# Patient Record
Sex: FEMALE
[De-identification: names, ages, dates, MRNs, and addresses within clinical notes are randomized per-mention and may not be internally consistent; named-entity substitution may affect disease eponyms.]

---

## 2022-11-01 PROBLEM — Z00.00 ENCOUNTER FOR PREVENTIVE HEALTH EXAMINATION: Status: ACTIVE | Noted: 2022-11-01

## 2022-12-02 ENCOUNTER — APPOINTMENT (OUTPATIENT)
Dept: GASTROENTEROLOGY | Facility: CLINIC | Age: 52
End: 2022-12-02

## 2022-12-02 DIAGNOSIS — Z12.11 ENCOUNTER FOR SCREENING FOR MALIGNANT NEOPLASM OF COLON: ICD-10-CM

## 2022-12-02 PROCEDURE — 99212 OFFICE O/P EST SF 10 MIN: CPT | Mod: 95

## 2022-12-02 RX ORDER — POLYETHYLENE GLYCOL 3350 AND ELECTROLYTES WITH LEMON FLAVOR 236; 22.74; 6.74; 5.86; 2.97 G/4L; G/4L; G/4L; G/4L; G/4L
236 POWDER, FOR SOLUTION ORAL
Qty: 1 | Refills: 0 | Status: ACTIVE | COMMUNITY
Start: 2022-12-02 | End: 1900-01-01

## 2022-12-02 NOTE — HISTORY OF PRESENT ILLNESS
[Home] : at home, [unfilled] , at the time of the visit. [Medical Office: ([facility])___] : at the medical office located in  [Verbal consent obtained from patient] : the patient, [unfilled] [FreeTextEntry4] : [place] [FreeTextEntry1] : 53yo female presents for evaluation for colonoscopy\par \par Pt reports feeling well overall. Denies abdominal pain or nausea/vomiting. Denies heartburn or dysphagia.\par Reports regular formed bm, denies blood in stools. Appetite good, denies weight loss.\par \par No prior colonoscopy\par No known family h/o colon ca\par \par Labs reviewed (2018)\par states had done recently at [facility]\par

## 2022-12-02 NOTE — REVIEW OF SYSTEMS
[As Noted in HPI] : as noted in HPI [Negative] : Neurological [Fever] : no fever [Chills] : no chills [Feeling Tired] : not feeling tired [Recent Weight Loss (___ Lbs)] : no recent weight loss [Chest Pain] : no chest pain [Palpitations] : no palpitations

## 2022-12-02 NOTE — ASSESSMENT
[FreeTextEntry1] : 53yo female presents for evaluation for initial screening colonoscopy. No GI complaints.\par \par -Recommend schedule screening colonoscopy\par -Discussed risks/benefits with pt in detail including but not limited to bleeding, infection, perforation, side effects of medications/sedation. Also discussed potential for missed lesions if not pursued and alternative noninvasive testing options such as stool tests. Pt verbalized understanding and wants to proceed with colonoscopy.\par -Obtain outside labs\par \par Follow up after testing\par Pt agreeable with plan, advised to contact us if questions/concerns in the interim\par